# Patient Record
Sex: FEMALE | Race: WHITE | NOT HISPANIC OR LATINO | Employment: FULL TIME | URBAN - METROPOLITAN AREA
[De-identification: names, ages, dates, MRNs, and addresses within clinical notes are randomized per-mention and may not be internally consistent; named-entity substitution may affect disease eponyms.]

---

## 2018-01-19 ENCOUNTER — OFFICE VISIT (OUTPATIENT)
Dept: OBGYN | Facility: CLINIC | Age: 43
End: 2018-01-19
Payer: COMMERCIAL

## 2018-01-19 ENCOUNTER — RADIANT APPOINTMENT (OUTPATIENT)
Dept: MAMMOGRAPHY | Facility: CLINIC | Age: 43
End: 2018-01-19
Payer: COMMERCIAL

## 2018-01-19 VITALS
HEIGHT: 63 IN | WEIGHT: 116 LBS | SYSTOLIC BLOOD PRESSURE: 102 MMHG | DIASTOLIC BLOOD PRESSURE: 64 MMHG | HEART RATE: 72 BPM | BODY MASS INDEX: 20.55 KG/M2

## 2018-01-19 DIAGNOSIS — Z13.6 SCREENING FOR CARDIOVASCULAR CONDITION: ICD-10-CM

## 2018-01-19 DIAGNOSIS — Z12.31 VISIT FOR SCREENING MAMMOGRAM: ICD-10-CM

## 2018-01-19 DIAGNOSIS — B00.1 COLD SORE: ICD-10-CM

## 2018-01-19 DIAGNOSIS — Z01.419 ENCOUNTER FOR GYNECOLOGICAL EXAMINATION WITHOUT ABNORMAL FINDING: Primary | ICD-10-CM

## 2018-01-19 LAB
ALBUMIN SERPL-MCNC: 4.2 G/DL (ref 3.4–5)
ALP SERPL-CCNC: 71 U/L (ref 40–150)
ALT SERPL W P-5'-P-CCNC: 17 U/L (ref 0–50)
ANION GAP SERPL CALCULATED.3IONS-SCNC: 8 MMOL/L (ref 3–14)
AST SERPL W P-5'-P-CCNC: 18 U/L (ref 0–45)
BILIRUB SERPL-MCNC: 0.5 MG/DL (ref 0.2–1.3)
BUN SERPL-MCNC: 12 MG/DL (ref 7–30)
CALCIUM SERPL-MCNC: 8.9 MG/DL (ref 8.5–10.1)
CHLORIDE SERPL-SCNC: 105 MMOL/L (ref 94–109)
CHOLEST SERPL-MCNC: 196 MG/DL
CO2 SERPL-SCNC: 25 MMOL/L (ref 20–32)
CREAT SERPL-MCNC: 0.64 MG/DL (ref 0.52–1.04)
GFR SERPL CREATININE-BSD FRML MDRD: >90 ML/MIN/1.7M2
GLUCOSE SERPL-MCNC: 92 MG/DL (ref 70–99)
HDLC SERPL-MCNC: 81 MG/DL
LDLC SERPL CALC-MCNC: 100 MG/DL
NONHDLC SERPL-MCNC: 115 MG/DL
POTASSIUM SERPL-SCNC: 4.3 MMOL/L (ref 3.4–5.3)
PROT SERPL-MCNC: 7.9 G/DL (ref 6.8–8.8)
SODIUM SERPL-SCNC: 138 MMOL/L (ref 133–144)
TRIGL SERPL-MCNC: 74 MG/DL

## 2018-01-19 PROCEDURE — 80053 COMPREHEN METABOLIC PANEL: CPT | Performed by: OBSTETRICS & GYNECOLOGY

## 2018-01-19 PROCEDURE — 80061 LIPID PANEL: CPT | Performed by: OBSTETRICS & GYNECOLOGY

## 2018-01-19 PROCEDURE — 87624 HPV HI-RISK TYP POOLED RSLT: CPT | Performed by: OBSTETRICS & GYNECOLOGY

## 2018-01-19 PROCEDURE — 99396 PREV VISIT EST AGE 40-64: CPT | Performed by: OBSTETRICS & GYNECOLOGY

## 2018-01-19 PROCEDURE — 77063 BREAST TOMOSYNTHESIS BI: CPT | Mod: TC

## 2018-01-19 PROCEDURE — G0145 SCR C/V CYTO,THINLAYER,RESCR: HCPCS | Performed by: OBSTETRICS & GYNECOLOGY

## 2018-01-19 PROCEDURE — 36415 COLL VENOUS BLD VENIPUNCTURE: CPT | Performed by: OBSTETRICS & GYNECOLOGY

## 2018-01-19 PROCEDURE — 77067 SCR MAMMO BI INCL CAD: CPT | Mod: TC

## 2018-01-19 RX ORDER — VALACYCLOVIR HYDROCHLORIDE 1 G/1
2000 TABLET, FILM COATED ORAL 2 TIMES DAILY
Qty: 4 TABLET | Refills: 3 | Status: SHIPPED | OUTPATIENT
Start: 2018-01-19 | End: 2018-04-13

## 2018-01-19 ASSESSMENT — ANXIETY QUESTIONNAIRES
IF YOU CHECKED OFF ANY PROBLEMS ON THIS QUESTIONNAIRE, HOW DIFFICULT HAVE THESE PROBLEMS MADE IT FOR YOU TO DO YOUR WORK, TAKE CARE OF THINGS AT HOME, OR GET ALONG WITH OTHER PEOPLE: NOT DIFFICULT AT ALL
3. WORRYING TOO MUCH ABOUT DIFFERENT THINGS: NOT AT ALL
2. NOT BEING ABLE TO STOP OR CONTROL WORRYING: NOT AT ALL
1. FEELING NERVOUS, ANXIOUS, OR ON EDGE: NOT AT ALL
5. BEING SO RESTLESS THAT IT IS HARD TO SIT STILL: NOT AT ALL
6. BECOMING EASILY ANNOYED OR IRRITABLE: NOT AT ALL
7. FEELING AFRAID AS IF SOMETHING AWFUL MIGHT HAPPEN: NOT AT ALL
GAD7 TOTAL SCORE: 0

## 2018-01-19 ASSESSMENT — PATIENT HEALTH QUESTIONNAIRE - PHQ9
5. POOR APPETITE OR OVEREATING: NOT AT ALL
SUM OF ALL RESPONSES TO PHQ QUESTIONS 1-9: 0

## 2018-01-19 NOTE — MR AVS SNAPSHOT
"              After Visit Summary   2018    Stefani Lund    MRN: [id number]           Patient Information     Date Of Birth          1975        Visit Information        Provider Department      2018 9:10 AM Dinesh Hodges MD [facility]        Today's Diagnoses     Encounter for gynecological examination without abnormal finding    -  1    Screening for cardiovascular condition        Cold sore           Follow-ups after your visit        Who to contact     If you have questions or need follow up information about today's clinic visit or your schedule please contact [facility] directly at [phone].  Normal or non-critical lab and imaging results will be communicated to you by FunGoPlayhart, letter or phone within 4 business days after the clinic has received the results. If you do not hear from us within 7 days, please contact the clinic through FunGoPlayhart or phone. If you have a critical or abnormal lab result, we will notify you by phone as soon as possible.  Submit refill requests through [employer] or call your pharmacy and they will forward the refill request to us. Please allow 3 business days for your refill to be completed.          Additional Information About Your Visit        MyChart Information     [employer] lets you send messages to your doctor, view your test results, renew your prescriptions, schedule appointments and more. To sign up, go to [email or web address] . Click on \"Log in\" on the left side of the screen, which will take you to the Welcome page. Then click on \"Sign up Now\" on the right side of the page.     You will be asked to enter the access code listed below, as well as some personal information. Please follow the directions to create your username and password.     Your access code is: [email or web address]  Expires: 2018  9:49 AM     Your access code will  in 90 days. If you need help or a new code, please call your " "[facility] or [phone].        Care EveryWhere ID     This is your Care EveryWhere ID. This could be used by other organizations to access your [place] medical records  [id number]        Your Vitals Were     Pulse Height BMI (Body Mass Index)             72 5' 2.5\" (1.588 m) 20.88 kg/m2          Blood Pressure from Last 3 Encounters:   01/19/18 102/64   06/14/16 98/64   05/01/15 110/62    Weight from Last 3 Encounters:   01/19/18 116 lb (52.6 kg)   06/14/16 115 lb (52.2 kg)   05/01/15 115 lb (52.2 kg)              We Performed the Following     Comprehensive metabolic panel     HPV High Risk Types DNA Cervical     Lipid panel reflex to direct LDL Fasting     Pap imaged thin layer screen with HPV - recommended age 30 - 65          Today's Medication Changes          These changes are accurate as of: 1/19/18  9:51 AM.  If you have any questions, ask your nurse or doctor.               Start taking these medicines.        Dose/Directions    valACYclovir 1000 mg tablet   Commonly known as:  VALTREX   Used for:  Cold sore   Started by:  Dinesh Hodges MD        Dose:  2000 mg   Take 2 tablets (2,000 mg) by mouth 2 times daily   Quantity:  4 tablet   Refills:  3            Where to get your medicines      These medications were sent to [place] [address] [place] [address] [address]     Phone:  [phone]     valACYclovir 1000 mg tablet                Primary Care Provider Office Phone # Fax #    Halle Rizo [phone]       [facility] [address]  [facility] [address]        Equal Access to Services     ANKITA CORRAL : Hadii mich Dooley, candy weston, qaroseanne bush. So [facility] [phone].    ATENCIÓN: Si habla español, tiene a garcia disposición servicios gratuitos de asistencia lingüística. Llame al [phone].    We comply with applicable federal " civil rights laws and [place] laws. We do not discriminate on the basis of race, color, national origin, age, disability, sex, sexual orientation, or gender identity.            Thank you!     Thank you for choosing [facility]  for your care. Our goal is always to provide you with excellent care. Hearing back from our patients is one way we can continue to improve our services. Please take a few minutes to complete the written survey that you may receive in the mail after your visit with us. Thank you!             Your Updated Medication List - Protect others around you: Learn how to safely use, store and throw away your medicines at www.disposemymeds.org.          This list is accurate as of: 1/19/18  9:51 AM.  Always use your most recent med list.                   Brand Name Dispense Instructions for use Diagnosis    valACYclovir 1000 mg tablet    VALTREX    4 tablet    Take 2 tablets (2,000 mg) by mouth 2 times daily    Cold sore       VITAMIN D3 PO      Taking seasonally.

## 2018-01-19 NOTE — PROGRESS NOTES
Stefani is a 42 year old  female who presents for annual exam.     Besides routine health maintenance,  she would like to discuss sensitive ovaries.    HPI:  The patient's PCP is Halle Rizo.    Noting cycle changes. Having midcycle pain.   Cycles still regular.  Bladder good.   Gets cold sores. Zovirax ointment expensive      GYNECOLOGIC HISTORY:    No LMP recorded.  Her current contraception method is: none.  She  reports that she has never smoked. She has never used smokeless tobacco.      Patient is sexually active.  STD testing offered?  Declined  Last PHQ-9 score on record =   PHQ-9 SCORE 2018   Total Score 0     Last GAD7 score on record =   JUAN FRANCISCO-7 SCORE 2018   Total Score 0     Alcohol Score = 2    HEALTH MAINTENANCE:  Cholesterol: 13   Total= 199, Triglycerides=57, HDL=64, ECF=122, FBS=87  Last Mammo: 16, Result: normal, Next Mammo: today   Pap: 13 wnl, HPV-  Colonoscopy:  Never, Result: not applicable, Next Colonoscopy: 7 years.  Dexa:  Never    Health maintenance updated:  yes    HISTORY:  Obstetric History       T0      L2     SAB0   TAB0   Ectopic0   Multiple0   Live Births0       Obstetric Comments   Adopted       There is no problem list on file for this patient.    Past Surgical History:   Procedure Laterality Date     LAPAROSCOPY      Endometriosis      Social History   Substance Use Topics     Smoking status: Never Smoker     Smokeless tobacco: Never Used     Alcohol use 0.0 oz/week     0 Standard drinks or equivalent per week      Problem (# of Occurrences) Relation (Name,Age of Onset)    Hyperlipidemia (1) Other    Hypertension (2) Father, Paternal Grandmother            Current Outpatient Prescriptions   Medication Sig     valACYclovir (VALTREX) 1000 mg tablet Take 2 tablets (2,000 mg) by mouth 2 times daily     Cholecalciferol (VITAMIN D3 PO) Taking seasonally.     No current facility-administered medications for this visit.   "    No Known Allergies    Past medical, surgical, social and family histories were reviewed and updated in EPIC.    ROS:   12 point review of systems negative other than symptoms noted below.  Breast: Tenderness  Gastrointestinal: Bloating and Blood in Stools  Genitourinary: Pelvic Pain and Spotting    EXAM:  /64  Pulse 72  Ht 5' 2.5\" (1.588 m)  Wt 116 lb (52.6 kg)  BMI 20.88 kg/m2   BMI: Body mass index is 20.88 kg/(m^2).    PHYSICAL EXAM:  Constitutional:  Appearance: Well nourished, well developed, alert, in no acute distress  Neck:  Lymph Nodes:  No lymphadenopathy present    Thyroid:  Gland size normal, nontender, no nodules or masses present  on palpation  Chest:  Respiratory Effort:  Breathing unlabored  Cardiovascular:    Heart: Auscultation:  Regular rate, normal rhythm, no murmurs present  Breasts: Inspection of Breasts:  No lymphadenopathy present., Palpation of Breasts and Axillae:  No masses present on palpation, no breast tenderness., Axillary Lymph Nodes:  No lymphadenopathy present. and No nodularity, asymmetry or nipple discharge bilaterally.  Gastrointestinal:   Abdominal Examination:  Abdomen nontender to palpation, tone normal without rigidity or guarding, no masses present, umbilicus without lesions   Liver and Spleen:  No hepatomegaly present, liver nontender to palpation    Hernias:  No hernias present  Lymphatic: Lymph Nodes:  No other lymphadenopathy present  Skin:  General Inspection:  No rashes present, no lesions present, no areas of  discoloration    Genitalia and Groin:  No rashes present, no lesions present, no areas of  discoloration, no masses present  Neurologic/Psychiatric:    Mental Status:  Oriented X3     Pelvic Exam:  External Genitalia:     Normal appearance for age, no discharge present, no tenderness present, no inflammatory lesions present, color normal  Vagina:     Normal vaginal vault without central or paravaginal defects, no discharge present, no inflammatory " lesions present, no masses present  Bladder:     Nontender to palpation  Urethra:   Urethral Body:  Urethra palpation normal, urethra structural support normal   Urethral Meatus:  No erythema or lesions present  Cervix:     Appearance healthy, no lesions present, nontender to palpation, no bleeding present  Uterus:     Uterus: firm, normal sized and nontender, anteverted in position.   Adnexa:     No adnexal tenderness present, no adnexal masses present  Perineum:     Perineum within normal limits, no evidence of trauma, no rashes or skin lesions present  Anus:     Anus within normal limits, no hemorrhoids present  Inguinal Lymph Nodes:     No lymphadenopathy present  Pubic Hair:     Normal pubic hair distribution for age  Genitalia and Groin:     No rashes present, no lesions present, no areas of discoloration, no masses present      COUNSELING:   Reviewed preventive health counseling, as reflected in patient instructions       Regular exercise    BMI: Body mass index is 20.88 kg/(m^2).        ASSESSMENT:  42 year old female with satisfactory annual exam.    ICD-10-CM    1. Encounter for gynecological examination without abnormal finding Z01.419 Pap imaged thin layer screen with HPV - recommended age 30 - 65     HPV High Risk Types DNA Cervical   2. Screening for cardiovascular condition Z13.6 Lipid panel reflex to direct LDL Fasting     Comprehensive metabolic panel   3. Cold sore B00.1 valACYclovir (VALTREX) 1000 mg tablet       PLAN:  Change to PO Valtrex.     Dinesh Hodges MD

## 2018-01-23 ASSESSMENT — ANXIETY QUESTIONNAIRES: GAD7 TOTAL SCORE: 0

## 2018-01-24 LAB
COPATH REPORT: NORMAL
PAP: NORMAL

## 2018-01-26 LAB
FINAL DIAGNOSIS: NORMAL
HPV HR 12 DNA CVX QL NAA+PROBE: NEGATIVE
HPV16 DNA SPEC QL NAA+PROBE: NEGATIVE
HPV18 DNA SPEC QL NAA+PROBE: NEGATIVE
SPECIMEN DESCRIPTION: NORMAL
SPECIMEN SOURCE CVX/VAG CYTO: NORMAL

## 2018-04-13 ENCOUNTER — TELEPHONE (OUTPATIENT)
Dept: OBGYN | Facility: CLINIC | Age: 43
End: 2018-04-13

## 2018-04-13 DIAGNOSIS — B00.1 COLD SORE: ICD-10-CM

## 2018-04-13 NOTE — TELEPHONE ENCOUNTER
Left detailed message on pt's phone. Called [facility], pt picked up the fill rx'd on 1/19/18 today so she has a full treatment course for any current outbreak sx she's having.    Routing to MD Hodges- Valtrex refill is pended for you if you usually do standing refills for when pts get their next HSV outbreak. Thank you!

## 2018-04-14 RX ORDER — VALACYCLOVIR HYDROCHLORIDE 1 G/1
2000 TABLET, FILM COATED ORAL 2 TIMES DAILY
Qty: 4 TABLET | Refills: 3 | Status: SHIPPED | OUTPATIENT
Start: 2018-04-14 | End: 2021-01-12

## 2019-02-21 ENCOUNTER — OFFICE VISIT (OUTPATIENT)
Dept: OBGYN | Facility: CLINIC | Age: 44
End: 2019-02-21
Payer: COMMERCIAL

## 2019-02-21 VITALS
WEIGHT: 120.8 LBS | BODY MASS INDEX: 22.23 KG/M2 | HEIGHT: 62 IN | DIASTOLIC BLOOD PRESSURE: 62 MMHG | SYSTOLIC BLOOD PRESSURE: 116 MMHG

## 2019-02-21 DIAGNOSIS — Z01.419 ENCOUNTER FOR GYNECOLOGICAL EXAMINATION WITHOUT ABNORMAL FINDING: Primary | ICD-10-CM

## 2019-02-21 DIAGNOSIS — F41.9 ANXIETY: ICD-10-CM

## 2019-02-21 PROCEDURE — 99396 PREV VISIT EST AGE 40-64: CPT | Performed by: OBSTETRICS & GYNECOLOGY

## 2019-02-21 RX ORDER — CITALOPRAM HYDROBROMIDE 10 MG/1
10 TABLET ORAL DAILY
COMMUNITY

## 2019-02-21 ASSESSMENT — PATIENT HEALTH QUESTIONNAIRE - PHQ9
SUM OF ALL RESPONSES TO PHQ QUESTIONS 1-9: 3
5. POOR APPETITE OR OVEREATING: SEVERAL DAYS

## 2019-02-21 ASSESSMENT — ANXIETY QUESTIONNAIRES
GAD7 TOTAL SCORE: 5
2. NOT BEING ABLE TO STOP OR CONTROL WORRYING: SEVERAL DAYS
6. BECOMING EASILY ANNOYED OR IRRITABLE: NOT AT ALL
7. FEELING AFRAID AS IF SOMETHING AWFUL MIGHT HAPPEN: SEVERAL DAYS
1. FEELING NERVOUS, ANXIOUS, OR ON EDGE: SEVERAL DAYS
IF YOU CHECKED OFF ANY PROBLEMS ON THIS QUESTIONNAIRE, HOW DIFFICULT HAVE THESE PROBLEMS MADE IT FOR YOU TO DO YOUR WORK, TAKE CARE OF THINGS AT HOME, OR GET ALONG WITH OTHER PEOPLE: SOMEWHAT DIFFICULT
3. WORRYING TOO MUCH ABOUT DIFFERENT THINGS: SEVERAL DAYS
5. BEING SO RESTLESS THAT IT IS HARD TO SIT STILL: NOT AT ALL

## 2019-02-21 ASSESSMENT — MIFFLIN-ST. JEOR: SCORE: 1151.2

## 2019-02-21 NOTE — PROGRESS NOTES
Stefani is a 43 year old  female who presents for annual exam.     Besides routine health maintenance,  she would like to discuss menopausal testing if she is or isnt in the begging stages.    HPI:  The patient's PCP is  Halle Rizo.    Having issues with anxiety that started this Fall. FH of anxiety.  Started on Celexa 10mg. Feels there is a little improvement. Wondered if this was hormone related.  Having monthly cycles  PCP did thyroid.      GYNECOLOGIC HISTORY:    Patient's last menstrual period was 2019 (exact date).  Her current contraception method is: none.  She  reports that  has never smoked. she has never used smokeless tobacco.    Patient is sexually active.  STD testing offered?  Declined  Last PHQ-9 score on record =   PHQ-9 SCORE 2019   PHQ-9 Total Score 3     Last GAD7 score on record =   JUAN FRANCISCO-7 SCORE 2019   Total Score 5     Alcohol Score = 2    HEALTH MAINTENANCE:  Cholesterol: (  Cholesterol   Date Value Ref Range Status   2018 196 <200 mg/dL Final   2013 199 115 - 199 mg/dL Final      Last Mammo: one year ago, Result: normal, Next Mammo: today   Pap: (  Lab Results   Component Value Date    PAP NIL 2018    )   Colonoscopy:none , Result: not applicable, Next Colonoscopy:  years.  Dexa:  none    Health maintenance updated:  yes    HISTORY:  Obstetric History       T0      L2     SAB0   TAB0   Ectopic0   Multiple0   Live Births0       Obstetric Comments   Adopted       There is no problem list on file for this patient.    Past Surgical History:   Procedure Laterality Date     LAPAROSCOPY      Endometriosis      Social History     Tobacco Use     Smoking status: Never Smoker     Smokeless tobacco: Never Used   Substance Use Topics     Alcohol use: Yes     Alcohol/week: 0.0 oz      Problem (# of Occurrences) Relation (Name,Age of Onset)    Hyperlipidemia (1) Other    Hypertension (2) Father, Paternal Grandmother         "    Current Outpatient Medications   Medication Sig     Cholecalciferol (VITAMIN D3 PO) Taking seasonally.     citalopram (CELEXA) 10 MG tablet Take 10 mg by mouth daily     valACYclovir (VALTREX) 1000 mg tablet Take 2 tablets (2,000 mg) by mouth 2 times daily     No current facility-administered medications for this visit.      No Known Allergies    Past medical, surgical, social and family histories were reviewed and updated in EPIC.    ROS:   12 point review of systems negative other than symptoms noted below.  Head: Nasal Congestion  Gastrointestinal: Nausea  Genitourinary: Irregular Menses, Night Sweats and Spotting  Musculoskeletal: Joint Pain  Psychiatric: Anxiety and Difficulty Sleeping    EXAM:  /62   Ht 1.575 m (5' 2\")   Wt 54.8 kg (120 lb 12.8 oz)   LMP 01/31/2019 (Exact Date)   BMI 22.09 kg/m     BMI: Body mass index is 22.09 kg/m .    PHYSICAL EXAM:  Constitutional:  Appearance: Well nourished, well developed, alert, in no acute distress  Neck:  Lymph Nodes:  No lymphadenopathy present    Thyroid:  Gland size normal, nontender, no nodules or masses present  on palpation  Chest:  Respiratory Effort:  Breathing unlabored  Cardiovascular:    Heart: Auscultation:  Regular rate, normal rhythm, no murmurs present  Breasts: Inspection of Breasts:  No lymphadenopathy present., Palpation of Breasts and Axillae:  No masses present on palpation, no breast tenderness., Axillary Lymph Nodes:  No lymphadenopathy present. and No nodularity, asymmetry or nipple discharge bilaterally.  Gastrointestinal:   Abdominal Examination:  Abdomen nontender to palpation, tone normal without rigidity or guarding, no masses present, umbilicus without lesions   Liver and Spleen:  No hepatomegaly present, liver nontender to palpation    Hernias:  No hernias present  Lymphatic: Lymph Nodes:  No other lymphadenopathy present  Skin:  General Inspection:  No rashes present, no lesions present, no areas of "  discoloration    Genitalia and Groin:  No rashes present, no lesions present, no areas of  discoloration, no masses present  Neurologic/Psychiatric:    Mental Status:  Oriented X3     Pelvic Exam:  External Genitalia:     Normal appearance for age, no discharge present, no tenderness present, no inflammatory lesions present, color normal  Vagina:     Normal vaginal vault without central or paravaginal defects, no discharge present, no inflammatory lesions present, no masses present  Bladder:     Nontender to palpation  Urethra:   Urethral Body:  Urethra palpation normal, urethra structural support normal   Urethral Meatus:  No erythema or lesions present  Cervix:     Appearance healthy, no lesions present, nontender to palpation, no bleeding present  Uterus:     Uterus: firm, normal sized and nontender, anteverted in position.   Adnexa:     No adnexal tenderness present, no adnexal masses present  Perineum:     Perineum within normal limits, no evidence of trauma, no rashes or skin lesions present  Anus:     Anus within normal limits, no hemorrhoids present  Inguinal Lymph Nodes:     No lymphadenopathy present  Pubic Hair:     Normal pubic hair distribution for age  Genitalia and Groin:     No rashes present, no lesions present, no areas of discoloration, no masses present      COUNSELING:   Reviewed preventive health counseling, as reflected in patient instructions       Regular exercise    BMI: Body mass index is 22.09 kg/m .      ASSESSMENT:  43 year old female with satisfactory annual exam.    ICD-10-CM    1. Encounter for routine gynecological examination Z01.419    2. Anxiety F41.9        PLAN:  Discussed cycles and anxiety. Doubt perimenopausal as an issue. Continue celexa and consider increasing dose to 20mg.    Dinesh Hodges MD

## 2019-02-22 ASSESSMENT — ANXIETY QUESTIONNAIRES: GAD7 TOTAL SCORE: 5

## 2019-04-24 ENCOUNTER — ANCILLARY PROCEDURE (OUTPATIENT)
Dept: MAMMOGRAPHY | Facility: CLINIC | Age: 44
End: 2019-04-24
Attending: OBSTETRICS & GYNECOLOGY
Payer: COMMERCIAL

## 2019-04-24 DIAGNOSIS — Z12.31 VISIT FOR SCREENING MAMMOGRAM: ICD-10-CM

## 2019-04-24 PROCEDURE — 77063 BREAST TOMOSYNTHESIS BI: CPT | Mod: TC

## 2019-04-24 PROCEDURE — 77067 SCR MAMMO BI INCL CAD: CPT | Mod: TC

## 2019-06-12 DIAGNOSIS — B00.1 COLD SORE: ICD-10-CM

## 2019-06-12 RX ORDER — VALACYCLOVIR HYDROCHLORIDE 1 G/1
2000 TABLET, FILM COATED ORAL 2 TIMES DAILY
Qty: 4 TABLET | Refills: 3 | Status: SHIPPED | OUTPATIENT
Start: 2019-06-12 | End: 2022-09-29

## 2019-06-12 NOTE — TELEPHONE ENCOUNTER
"Requested Prescriptions   Pending Prescriptions Disp Refills     valACYclovir (VALTREX) 1000 mg tablet [Pharmacy Med Name: VALACYCLOVIR HCL 1 GRAM TABLET] 4 tablet 3     Sig: TAKE 2 TABLETS (2,000 MG) BY MOUTH 2 TIMES DAILY       Antivirals for Herpes Protocol Failed - 6/12/2019 11:30 AM        Failed - Normal serum creatinine on file in past 12 months     Recent Labs   Lab Test 01/19/18  0922   CR 0.64             Passed - Patient is age 12 or older        Passed - Recent (12 mo) or future (30 days) visit within the authorizing provider's specialty     Patient had office visit in the last 12 months or has a visit in the next 30 days with authorizing provider or within the authorizing provider's specialty.  See \"Patient Info\" tab in inbasket, or \"Choose Columns\" in Meds & Orders section of the refill encounter.              Passed - Medication is active on med list          Medication refilled  Madhavi De Jesus RN on 6/12/2019 at 1:01 PM    "

## 2020-11-12 ENCOUNTER — HOSPITAL ENCOUNTER (OUTPATIENT)
Dept: MAMMOGRAPHY | Facility: CLINIC | Age: 45
Discharge: HOME | End: 2020-11-12
Attending: OBSTETRICS & GYNECOLOGY | Admitting: OBSTETRICS & GYNECOLOGY
Payer: COMMERCIAL

## 2020-11-12 DIAGNOSIS — Z12.31 VISIT FOR SCREENING MAMMOGRAM: ICD-10-CM

## 2020-11-12 PROCEDURE — 77063 BREAST TOMOSYNTHESIS BI: CPT

## 2021-01-11 NOTE — PROGRESS NOTES
"Stefani is a 45 year old  female who presents for annual exam.     Besides routine health maintenance,  she would like to discuss ovarian pain.    HPI:  Here today for yearly exam --doing well.  Continues to have fairly regular menses.  Some inconsistency in timing --cycles every 21-28d currently.  Generally 5d menses --not terribly heavy or painful.  Occ mid cycle spotting but not every month.  Occ \"ovary pain\" --felt a bit of discomfort yesterday which is midcycle.  No bowel issues.  Some NICOLE --has noticed some leaking with sneeze, laugh, exercise.  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    Regular menses? no  Menses every 21 days.  Length of menses: 5 days    Her current contraception method is: none.  She  reports that she has never smoked. She has never used smokeless tobacco.    Patient is sexually active.  STD testing offered?  Declined  Last PHQ-9 score on record =   PHQ-9 SCORE 2021   PHQ-9 Total Score 0     Last GAD7 score on record =   JUAN FRANCISCO-7 SCORE 2021   Total Score 0     Alcohol Score = 2    HEALTH MAINTENANCE:  Cholesterol:   Recent Labs   Lab Test 18  0922 13   CHOL 196 199   HDL 81 64   * 124   TRIG 74 57     Last Mammo: 2020, Result: Normal, Next Mammo: 2021  Pap: HPV NEGATIVE  Lab Results   Component Value Date    PAP NIL 2018      Colonoscopy:  NA, Next Colonoscopy: AGE 49.  Dexa:  NA    Health maintenance updated:  no, due for tdap.     HISTORY:  OB History    Para Term  AB Living   0 0 0 0 0 2   SAB TAB Ectopic Multiple Live Births   0 0 0 0 0   Obstetric Comments   Adopted " "      There is no problem list on file for this patient.    Past Surgical History:   Procedure Laterality Date     LAPAROSCOPY  1999/2000    Endometriosis      Social History     Tobacco Use     Smoking status: Never Smoker     Smokeless tobacco: Never Used   Substance Use Topics     Alcohol use: Yes     Frequency: 2-4 times a month     Drinks per session: 1 or 2     Binge frequency: Never      Problem (# of Occurrences) Relation (Name,Age of Onset)    Hyperlipidemia (1) Other    Hypertension (2) Father, Paternal Grandmother            Current Outpatient Medications   Medication Sig     Cholecalciferol (VITAMIN D3 PO) Taking seasonally.     citalopram (CELEXA) 10 MG tablet Take 10 mg by mouth daily     valACYclovir (VALTREX) 1000 mg tablet Take 2 tablets (2,000 mg) by mouth 2 times daily     valACYclovir (VALTREX) 1000 mg tablet TAKE 2 TABLETS (2,000 MG) BY MOUTH 2 TIMES DAILY (Patient not taking: Reported on 1/12/2021)     No current facility-administered medications for this visit.      No Known Allergies    Past medical, surgical, social and family histories were reviewed and updated in EPIC.    ROS:   12 point review of systems negative other than symptoms noted below or in the HPI.  No urinary frequency or dysuria, bladder or kidney problems    EXAM:  /66   Pulse 62   Ht 1.575 m (5' 2\")   Wt 57.2 kg (126 lb)   LMP 01/01/2020   BMI 23.05 kg/m     BMI: Body mass index is 23.05 kg/m .    PHYSICAL EXAM:  Constitutional:   Appearance: Well nourished, well developed, alert, in no acute distress  Neck:  Lymph Nodes:  No lymphadenopathy present    Thyroid:  Gland size normal, nontender, no nodules or masses present  on palpation  Chest:  Respiratory Effort:  Breathing unlabored  Cardiovascular:    Heart: Auscultation:  Regular rate, normal rhythm, no murmurs present  Breasts: Inspection of Breasts:  No lymphadenopathy present., Palpation of Breasts and Axillae:  No masses present on palpation, no breast " tenderness., Axillary Lymph Nodes:  No lymphadenopathy present. and No nodularity, asymmetry or nipple discharge bilaterally.  Gastrointestinal:   Abdominal Examination:  Abdomen nontender to palpation, tone normal without rigidity or guarding, no masses present, umbilicus without lesions   Liver and Spleen:  No hepatomegaly present, liver nontender to palpation    Hernias:  No hernias present  Lymphatic: Lymph Nodes:  No other lymphadenopathy present  Skin:  General Inspection:  No rashes present, no lesions present, no areas of  discoloration  Neurologic:    Mental Status:  Oriented X3.  Normal strength and tone, sensory exam                grossly normal, mentation intact and speech normal.    Psychiatric:   Mentation appears normal and affect normal/bright.         Pelvic Exam:  External Genitalia:     Normal appearance for age, no discharge present, no tenderness present, no inflammatory lesions present, color normal  Vagina:     Normal vaginal vault without central or paravaginal defects, no discharge present, no inflammatory lesions present, no masses present  Bladder:     Nontender to palpation  Urethra:   Urethral Body:  Urethra palpation normal, urethra structural support normal   Urethral Meatus:  No erythema or lesions present  Cervix:     Appearance healthy, no lesions present, nontender to palpation, no bleeding present  Uterus:     Uterus: firm, normal sized and nontender, midplane in position.   Adnexa:     No adnexal tenderness present, no adnexal masses present  Perineum:     Perineum within normal limits, no evidence of trauma, no rashes or skin lesions present  Anus:     Anus within normal limits, no hemorrhoids present  Inguinal Lymph Nodes:     No lymphadenopathy present  Pubic Hair:     Normal pubic hair distribution for age  Genitalia and Groin:     No rashes present, no lesions present, no areas of discoloration, no masses present      COUNSELING:   Reviewed preventive health counseling, as  reflected in patient instructions  Special attention given to:        Regular exercise       Healthy diet/nutrition       (Latesha)menopause management    BMI: Body mass index is 23.05 kg/m .      ASSESSMENT:  45 year old female with satisfactory annual exam.    ICD-10-CM    1. Encounter for gynecological examination without abnormal finding  Z01.419 Pap imaged thin layer screen with HPV - recommended age 30 - 65     HPV High Risk Types DNA Cervical   2. Cold sore  B00.1 valACYclovir (VALTREX) 1000 mg tablet       PLAN:  Patient Instructions   Schedule your annual screening mammogram  Follow up with your primary care provider for your other medical problems.  Continue self breast exam.  Increase physical activity and exercise.  Lab and pap smear results will be called to the patient.  Co-testing repeat today per Dr. Hodges's previous plan.  If normal, due to lack of hx of abnormal pap smears, will adapt current guidelines with co-testing every 5yrs.  Usual safety and preventative measures counseling done.  Tdap today.      Salina Garcia MD

## 2021-01-12 ENCOUNTER — OFFICE VISIT (OUTPATIENT)
Dept: OBGYN | Facility: CLINIC | Age: 46
End: 2021-01-12
Payer: COMMERCIAL

## 2021-01-12 VITALS
HEIGHT: 62 IN | DIASTOLIC BLOOD PRESSURE: 66 MMHG | HEART RATE: 62 BPM | BODY MASS INDEX: 23.19 KG/M2 | WEIGHT: 126 LBS | SYSTOLIC BLOOD PRESSURE: 110 MMHG

## 2021-01-12 DIAGNOSIS — Z23 NEED FOR VACCINATION: ICD-10-CM

## 2021-01-12 DIAGNOSIS — B00.1 COLD SORE: ICD-10-CM

## 2021-01-12 DIAGNOSIS — Z01.419 ENCOUNTER FOR GYNECOLOGICAL EXAMINATION WITHOUT ABNORMAL FINDING: Primary | ICD-10-CM

## 2021-01-12 PROCEDURE — G0145 SCR C/V CYTO,THINLAYER,RESCR: HCPCS | Performed by: OBSTETRICS & GYNECOLOGY

## 2021-01-12 PROCEDURE — 99396 PREV VISIT EST AGE 40-64: CPT | Mod: 25 | Performed by: OBSTETRICS & GYNECOLOGY

## 2021-01-12 PROCEDURE — 90715 TDAP VACCINE 7 YRS/> IM: CPT | Performed by: OBSTETRICS & GYNECOLOGY

## 2021-01-12 PROCEDURE — 90471 IMMUNIZATION ADMIN: CPT | Performed by: OBSTETRICS & GYNECOLOGY

## 2021-01-12 PROCEDURE — 87624 HPV HI-RISK TYP POOLED RSLT: CPT | Performed by: OBSTETRICS & GYNECOLOGY

## 2021-01-12 RX ORDER — VALACYCLOVIR HYDROCHLORIDE 1 G/1
2000 TABLET, FILM COATED ORAL 2 TIMES DAILY
Qty: 90 TABLET | Refills: 3 | Status: SHIPPED | OUTPATIENT
Start: 2021-01-12 | End: 2022-02-15

## 2021-01-12 SDOH — HEALTH STABILITY: MENTAL HEALTH: HOW OFTEN DO YOU HAVE A DRINK CONTAINING ALCOHOL?: 2-4 TIMES A MONTH

## 2021-01-12 SDOH — HEALTH STABILITY: MENTAL HEALTH: HOW OFTEN DO YOU HAVE 6 OR MORE DRINKS ON ONE OCCASION?: NEVER

## 2021-01-12 SDOH — HEALTH STABILITY: MENTAL HEALTH: HOW MANY STANDARD DRINKS CONTAINING ALCOHOL DO YOU HAVE ON A TYPICAL DAY?: 1 OR 2

## 2021-01-12 ASSESSMENT — ANXIETY QUESTIONNAIRES
IF YOU CHECKED OFF ANY PROBLEMS ON THIS QUESTIONNAIRE, HOW DIFFICULT HAVE THESE PROBLEMS MADE IT FOR YOU TO DO YOUR WORK, TAKE CARE OF THINGS AT HOME, OR GET ALONG WITH OTHER PEOPLE: NOT DIFFICULT AT ALL
6. BECOMING EASILY ANNOYED OR IRRITABLE: NOT AT ALL
1. FEELING NERVOUS, ANXIOUS, OR ON EDGE: NOT AT ALL
3. WORRYING TOO MUCH ABOUT DIFFERENT THINGS: NOT AT ALL
5. BEING SO RESTLESS THAT IT IS HARD TO SIT STILL: NOT AT ALL
2. NOT BEING ABLE TO STOP OR CONTROL WORRYING: NOT AT ALL
GAD7 TOTAL SCORE: 0
7. FEELING AFRAID AS IF SOMETHING AWFUL MIGHT HAPPEN: NOT AT ALL

## 2021-01-12 ASSESSMENT — MIFFLIN-ST. JEOR: SCORE: 1164.78

## 2021-01-12 ASSESSMENT — PATIENT HEALTH QUESTIONNAIRE - PHQ9
5. POOR APPETITE OR OVEREATING: NOT AT ALL
SUM OF ALL RESPONSES TO PHQ QUESTIONS 1-9: 0

## 2021-01-12 NOTE — PATIENT INSTRUCTIONS
Schedule your annual screening mammogram  Follow up with your primary care provider for your other medical problems.  Continue self breast exam.  Increase physical activity and exercise.  Lab and pap smear results will be called to the patient.  Co-testing repeat today per Dr. Hodges's previous plan.  If normal, due to lack of hx of abnormal pap smears, will adapt current guidelines with co-testing every 5yrs.  Usual safety and preventative measures counseling done.  Tdap today.

## 2021-01-13 ASSESSMENT — ANXIETY QUESTIONNAIRES: GAD7 TOTAL SCORE: 0

## 2021-01-14 LAB
COPATH REPORT: NORMAL
PAP: NORMAL

## 2022-02-15 DIAGNOSIS — B00.1 COLD SORE: ICD-10-CM

## 2022-02-15 RX ORDER — VALACYCLOVIR HYDROCHLORIDE 1 G/1
TABLET, FILM COATED ORAL
Qty: 90 TABLET | Refills: 0 | Status: SHIPPED | OUTPATIENT
Start: 2022-02-15 | End: 2023-10-19

## 2022-02-15 NOTE — TELEPHONE ENCOUNTER
"Requested Prescriptions   Pending Prescriptions Disp Refills     valACYclovir (VALTREX) 1000 mg tablet [Pharmacy Med Name: VALACYCLOVIR 1GM TABLETS] 90 tablet 3     Sig: TAKE 2 TABLETS(2000 MG) BY MOUTH TWICE DAILY       Antivirals for Herpes Protocol Failed - 2/15/2022  3:42 PM        Failed - Recent (12 mo) or future (30 days) visit within the authorizing provider's specialty     Patient has had an office visit with the authorizing provider or a provider within the authorizing providers department within the previous 12 mos or has a future within next 30 days. See \"Patient Info\" tab in inbasket, or \"Choose Columns\" in Meds & Orders section of the refill encounter.              Failed - Normal serum creatinine on file in past 12 months     Recent Labs   Lab Test 01/19/18  0922   CR 0.64       Ok to refill medication if creatinine is low          Passed - Patient is age 12 or older        Passed - Medication is active on med list           Prescription approved per [facility] Refill Protocol.  Appointment needed for further refills  Madhavi De Jesus RN on 2/15/2022 at 3:49 PM    "

## 2022-09-29 ENCOUNTER — OFFICE VISIT (OUTPATIENT)
Dept: OBGYN | Facility: CLINIC | Age: 47
End: 2022-09-29
Payer: COMMERCIAL

## 2022-09-29 VITALS
HEIGHT: 63 IN | WEIGHT: 130 LBS | BODY MASS INDEX: 23.04 KG/M2 | DIASTOLIC BLOOD PRESSURE: 56 MMHG | SYSTOLIC BLOOD PRESSURE: 100 MMHG

## 2022-09-29 DIAGNOSIS — Z01.419 ENCOUNTER FOR GYNECOLOGICAL EXAMINATION WITHOUT ABNORMAL FINDING: Primary | ICD-10-CM

## 2022-09-29 DIAGNOSIS — Z23 NEED FOR PROPHYLACTIC VACCINATION AND INOCULATION AGAINST INFLUENZA: ICD-10-CM

## 2022-09-29 PROCEDURE — 90471 IMMUNIZATION ADMIN: CPT | Performed by: OBSTETRICS & GYNECOLOGY

## 2022-09-29 PROCEDURE — 90686 IIV4 VACC NO PRSV 0.5 ML IM: CPT | Performed by: OBSTETRICS & GYNECOLOGY

## 2022-09-29 PROCEDURE — 99396 PREV VISIT EST AGE 40-64: CPT | Mod: 25 | Performed by: OBSTETRICS & GYNECOLOGY

## 2022-09-29 RX ORDER — CHLORAL HYDRATE 500 MG
2 CAPSULE ORAL DAILY
COMMUNITY

## 2022-09-29 ASSESSMENT — ANXIETY QUESTIONNAIRES
1. FEELING NERVOUS, ANXIOUS, OR ON EDGE: NOT AT ALL
3. WORRYING TOO MUCH ABOUT DIFFERENT THINGS: NOT AT ALL
GAD7 TOTAL SCORE: 0
5. BEING SO RESTLESS THAT IT IS HARD TO SIT STILL: NOT AT ALL
IF YOU CHECKED OFF ANY PROBLEMS ON THIS QUESTIONNAIRE, HOW DIFFICULT HAVE THESE PROBLEMS MADE IT FOR YOU TO DO YOUR WORK, TAKE CARE OF THINGS AT HOME, OR GET ALONG WITH OTHER PEOPLE: NOT DIFFICULT AT ALL
6. BECOMING EASILY ANNOYED OR IRRITABLE: NOT AT ALL
GAD7 TOTAL SCORE: 0
7. FEELING AFRAID AS IF SOMETHING AWFUL MIGHT HAPPEN: NOT AT ALL
2. NOT BEING ABLE TO STOP OR CONTROL WORRYING: NOT AT ALL

## 2022-09-29 ASSESSMENT — PATIENT HEALTH QUESTIONNAIRE - PHQ9
5. POOR APPETITE OR OVEREATING: NOT AT ALL
SUM OF ALL RESPONSES TO PHQ QUESTIONS 1-9: 0

## 2022-09-29 NOTE — PATIENT INSTRUCTIONS
Follow up with your primary care provider for your other medical problems.  Continue self breast exam.  Increase physical activity and exercise.  Usual safety and preventative measures counseling done.  Flu Shot today.  Last pap smear (2021) was normal and negative for the DNA of high risk HPV subtypes.  No pap was obtained this year.  This was discussed with the patient and she agrees with the plan.  Card given for Nuvia Schaffer for sex therapy given complicated past with sexual health.   Will arrange first colonoscopy; referral placed by PCP per patient report.

## 2022-09-29 NOTE — PROGRESS NOTES
Stefani is a 47 year old  female who presents for annual exam.     Besides routine health maintenance, she has no other health concerns today .    HPI:  Here today for yearly exam --doing well.  Continues to have fairly regular monthly cycles x 4-5d.  Not terribly heavy bleeding or cramping. Occ cramping with ovulation.  Has had a couple of cycles which were a week or two late.  +SA but infrequent.  Hx of fertility issues, infidelity, etc.  Interested in referral to sex therapist.  No bowel issues.  Occ NICOLE with running, exercise, sneeze, etc    ; teaches high school Spanish at [place]; kids are doing well --son is senior and daughter is 16yo at [employer]; spend summer in [place]  -staying active with pickleball!  Also doing some strength training, walking, etc  +mammo next week; +SBE --no issues  PCP -Halle Rizo --follows bloodwork, meds, etc.    -due for colonoscopy but has had referral placed by PCP  -agrees to flu shot today but would like to push back covid booster; +COVID in late July      GYNECOLOGIC HISTORY:    Patient's last menstrual period was 2022.    Regular menses? no  Menses every 28-40 days.  Length of menses: 5 days    Her current contraception method is: none.  She  reports that she has never smoked. She has never used smokeless tobacco.    Patient is sexually active.  STD testing offered?  Declined  Last PHQ-9 score on record =   PHQ-9 SCORE 2022   PHQ-9 Total Score 0     Last GAD7 score on record =   JUAN FRANCISCO-7 SCORE 2022   Total Score 0     Alcohol Score = 2    HEALTH MAINTENANCE:  Cholesterol: 2022   Total= 226, Triglycerides=109, HDL=47, SZV=432, FBS=89, TSH=1.09    Cholesterol   Date Value Ref Range Status   2018 196 <200 mg/dL Final   2013 199 115 - 199 mg/dL Final      Last Mammo: , Result: Normal, Next Mammo: scheduled next week   Pap: (  Lab Results   Component Value Date    PAP NIL 2021    PAP NIL 2018    )  hpv  "neg  Colonoscopy:  never, Result: never, Next Colonoscopy: was given referral from PCP  Horace:  never    Health maintenance updated:  yes, will think about vaccines    HISTORY:  OB History    Para Term  AB Living   0 0 0 0 0 2   SAB IAB Ectopic Multiple Live Births   0 0 0 0 0   Obstetric Comments   Adopted       There is no problem list on file for this patient.    Past Surgical History:   Procedure Laterality Date     LAPAROSCOPY      Endometriosis      Social History     Tobacco Use     Smoking status: Never Smoker     Smokeless tobacco: Never Used   Substance Use Topics     Alcohol use: Yes      Problem (# of Occurrences) Relation (Name,Age of Onset)    Hyperlipidemia (1) Other    Hypertension (2) Father, Paternal Grandmother            Current Outpatient Medications   Medication Sig     Cholecalciferol (VITAMIN D3 PO) Taking seasonally.     citalopram (CELEXA) 10 MG tablet Take 10 mg by mouth daily     fish oil-omega-3 fatty acids 1000 MG capsule Take 2 g by mouth daily     valACYclovir (VALTREX) 1000 mg tablet TAKE 2 TABLETS(2000 MG) BY MOUTH TWICE DAILY     No current facility-administered medications for this visit.     No Known Allergies    Past medical, surgical, social and family histories were reviewed and updated in EPIC.    ROS:   12 point review of systems negative other than symptoms noted below or in the HPI.  No urinary frequency or dysuria, bladder or kidney problems, Normal menstrual cycles    EXAM:  /56   Ht 1.6 m (5' 3\")   Wt 59 kg (130 lb)   LMP 2022   BMI 23.03 kg/m     BMI: Body mass index is 23.03 kg/m .    PHYSICAL EXAM:  Constitutional:   Appearance: Well nourished, well developed, alert, in no acute distress  Neck:  Lymph Nodes:  No lymphadenopathy present    Thyroid:  Gland size normal, nontender, no nodules or masses present  on palpation  Chest:  Respiratory Effort:  Breathing unlabored  Cardiovascular:    Heart: Auscultation:  Regular rate, " normal rhythm, no murmurs present  Breasts: Palpation of Breasts and Axillae:  No masses present on palpation, no breast tenderness., Axillary Lymph Nodes:  No lymphadenopathy present. and No nodularity, asymmetry or nipple discharge bilaterally.  Gastrointestinal:   Abdominal Examination:  Abdomen nontender to palpation, tone normal without rigidity or guarding, no masses present, umbilicus without lesions   Liver and Spleen:  No hepatomegaly present, liver nontender to palpation    Hernias:  No hernias present  Lymphatic: Lymph Nodes:  No other lymphadenopathy present  Skin:  General Inspection:  No rashes present, no lesions present, no areas of  discoloration  Neurologic:    Mental Status:  Oriented X3.  Normal strength and tone, sensory exam                grossly normal, mentation intact and speech normal.    Psychiatric:   Mentation appears normal and affect normal/bright.         Pelvic Exam:  External Genitalia:     Normal appearance for age, no discharge present, no tenderness present, no inflammatory lesions present, color normal  Vagina:     Normal vaginal vault without central or paravaginal defects, no discharge present, no inflammatory lesions present, no masses present  Bladder:     Nontender to palpation  Urethra:   Urethral Body:  Urethra palpation normal, urethra structural support normal   Urethral Meatus:  No erythema or lesions present  Cervix:     Appearance healthy, no lesions present, nontender to palpation, no bleeding present  Uterus:     Uterus: firm, normal sized and nontender, anteverted in position.   Adnexa:     No adnexal tenderness present, no adnexal masses present  Perineum:     Perineum within normal limits, no evidence of trauma, no rashes or skin lesions present  Anus:     Anus within normal limits, no hemorrhoids present  Inguinal Lymph Nodes:     No lymphadenopathy present  Pubic Hair:     Normal pubic hair distribution for age  Genitalia and Groin:     No rashes present, no  lesions present, no areas of discoloration, no masses present      COUNSELING:   Reviewed preventive health counseling, as reflected in patient instructions  Special attention given to:        Regular exercise       Healthy diet/nutrition       Colorectal Cancer Screening       (Latesha)menopause management    BMI: Body mass index is 23.03 kg/m .      ASSESSMENT:  47 year old female with satisfactory annual exam.    ICD-10-CM    1. Encounter for gynecological examination without abnormal finding  Z01.419        PLAN:  Patient Instructions   Follow up with your primary care provider for your other medical problems.  Continue self breast exam.  Increase physical activity and exercise.  Usual safety and preventative measures counseling done.  Flu Shot today.  Last pap smear (2021) was normal and negative for the DNA of high risk HPV subtypes.  No pap was obtained this year.  This was discussed with the patient and she agrees with the plan.  Card given for Nuvia Schaffer for sex therapy given complicated past with sexual health.   Will arrange first colonoscopy; referral placed by PCP per patient report.      Salina Garcia MD

## 2022-10-06 ENCOUNTER — HOSPITAL ENCOUNTER (OUTPATIENT)
Dept: MAMMOGRAPHY | Facility: CLINIC | Age: 47
Discharge: HOME | End: 2022-10-06
Attending: OBSTETRICS & GYNECOLOGY | Admitting: OBSTETRICS & GYNECOLOGY
Payer: COMMERCIAL

## 2022-10-06 DIAGNOSIS — Z12.31 VISIT FOR SCREENING MAMMOGRAM: ICD-10-CM

## 2022-10-06 PROCEDURE — 77067 SCR MAMMO BI INCL CAD: CPT

## 2023-05-09 NOTE — PROGRESS NOTES
SUBJECTIVE:                                                   Stefani Lund is a 48 year old female who presents to clinic today for the following health issue(s):  Patient presents with:  Consult: Perimenopause sx- spout of anxiety, night sweats, irregular periods and fatigue      Additional information: NA    HPI:  Here today to discuss possible perimenopausal symptoms.  Had very stressful time in Feb-March with illness in her son.  Also around that time was not having periods and was noticing more night sweats and anxiety.  Often would wake up very early with night sweats/chills and then have trouble falling asleep due to nausea and worry.   Discussed symptoms with her PCP Dr. Saldana at that time.  Increased her citalopram dosing, started seeing therapist, became more active in yoga, started more supplements, etc.  Symptoms did seem to linger in spite of her son's illness resolving.   Has since had another period (late April) and feeling much better.  Hard to know how much was hormonal vs other interventions that were implemented to help specifically with anxiety.  Eating/drinking well.  Staying active.  No daytime symptoms of hot flushes.  Moods good.    Several friends use different hormones and she wanted to discuss perimenopause and options if symptoms/issues were to return.  Fully understands and agrees that most recent episode was multifactorial and also in response to very stressful situation  Less than one month of school left!  Son graduating from high school this year.  Will travel to [place] this summer!  Very excited      Patient's last menstrual period was 2023..     Patient is sexually active, .  Using none for contraception.    reports that she has never smoked. She has never used smokeless tobacco.  STD testing offered?  Declined    Health maintenance updated:  Yes, reviewed    Today's PHQ-2 Score:       2023     9:14 AM   PHQ-2 (  [employer])   Q1: Little interest or  "pleasure in doing things 0   Q2: Feeling down, depressed or hopeless 0   PHQ-2 Score 0     Today's PHQ-9 Score:       9/29/2022     3:07 PM   PHQ-9 SCORE   PHQ-9 Total Score 0     Today's JUAN FRANCISCO-7 Score:       9/29/2022     3:07 PM   JUAN FRANCISCO-7 SCORE   Total Score 0       Problem list and histories reviewed & adjusted, as indicated.  Additional history: as documented.    There is no problem list on file for this patient.    Past Surgical History:   Procedure Laterality Date     LAPAROSCOPY  1999/2000    Endometriosis      Social History     Tobacco Use     Smoking status: Never     Smokeless tobacco: Never   Vaping Use     Vaping status: Not on file   Substance Use Topics     Alcohol use: Yes      Problem (# of Occurrences) Relation (Name,Age of Onset)    Hypertension (2) Father, Paternal Grandmother    Hyperlipidemia (1) Other            Current Outpatient Medications   Medication Sig     Cholecalciferol (VITAMIN D3 PO) Taking seasonally.     citalopram (CELEXA) 10 MG tablet Take 10 mg by mouth daily     citalopram (CELEXA) 20 MG tablet      fish oil-omega-3 fatty acids 1000 MG capsule Take 2 g by mouth daily     valACYclovir (VALTREX) 1000 mg tablet TAKE 2 TABLETS(2000 MG) BY MOUTH TWICE DAILY     No current facility-administered medications for this visit.     No Known Allergies    ROS:  12 point review of systems negative other than symptoms noted below or in the HPI.  No urinary frequency or dysuria, bladder or kidney problems      OBJECTIVE:     /64   Ht 1.6 m (5' 3\")   Wt 58.6 kg (129 lb 3.2 oz)   LMP 04/20/2023   BMI 22.89 kg/m    Body mass index is 22.89 kg/m .    Exam:  Constitutional:  Appearance: Well nourished, well developed alert, in no acute distress  Neurologic:  Mental Status:  Oriented X3.  Normal strength and tone, sensory exam grossly normal, mentation intact and speech normal.    Psychiatric:  Mentation appears normal and affect normal/bright.     In-Clinic Test Results:  No results found for " this or any previous visit (from the past 24 hour(s)).    ASSESSMENT/PLAN:                                                        ICD-10-CM    1. Night sweats  R61       2. Anxiety  F41.9           Patient Instructions   Long discussion today regarding perimenopause and common symptoms with hormonal changes of this transition.  Discussed slow waxing and waning of ovarian function and estrogen levels which can lead to irregular cycles and symptoms during lower estrogen level times.    With recent stressful situation at home, I suspect that her recent symptoms were a combination of both hormonal effects as well as a major stressor.  With feeling better at this time, we will continue to monitor symptoms moving forward.  If perimenopausal symptoms were to worsen in spite of life situation, may consider HRT at that time.  Discussed HRT and risks and benefits.  Discussed importance of responsible use with the lowest effective dose for the shortest time needed.  Discussed trials and safety profile in the appropriate women.    Will otherwise follow up with me for yearly exam in September.      Total time spent on day of visit with chart/results review, patient visit and counseling and documentation:  31min    Salina Garcia MD  [facility]

## 2023-05-11 ENCOUNTER — OFFICE VISIT (OUTPATIENT)
Dept: OBGYN | Facility: CLINIC | Age: 48
End: 2023-05-11
Payer: COMMERCIAL

## 2023-05-11 VITALS
SYSTOLIC BLOOD PRESSURE: 102 MMHG | HEIGHT: 63 IN | BODY MASS INDEX: 22.89 KG/M2 | WEIGHT: 129.2 LBS | DIASTOLIC BLOOD PRESSURE: 64 MMHG

## 2023-05-11 DIAGNOSIS — F41.9 ANXIETY: ICD-10-CM

## 2023-05-11 DIAGNOSIS — R61 NIGHT SWEATS: Primary | ICD-10-CM

## 2023-05-11 PROCEDURE — 99214 OFFICE O/P EST MOD 30 MIN: CPT | Performed by: OBSTETRICS & GYNECOLOGY

## 2023-05-11 RX ORDER — CITALOPRAM HYDROBROMIDE 20 MG/1
TABLET ORAL
COMMUNITY
Start: 2023-03-26

## 2023-05-11 NOTE — PATIENT INSTRUCTIONS
Long discussion today regarding perimenopause and common symptoms with hormonal changes of this transition.  Discussed slow waxing and waning of ovarian function and estrogen levels which can lead to irregular cycles and symptoms during lower estrogen level times.    With recent stressful situation at home, I suspect that her recent symptoms were a combination of both hormonal effects as well as a major stressor.  With feeling better at this time, we will continue to monitor symptoms moving forward.  If perimenopausal symptoms were to worsen in spite of life situation, may consider HRT at that time.  Discussed HRT and risks and benefits.  Discussed importance of responsible use with the lowest effective dose for the shortest time needed.  Discussed trials and safety profile in the appropriate women.    Will otherwise follow up with me for yearly exam in September.

## 2023-10-17 NOTE — PROGRESS NOTES
Stefani is a 48 year old  female who presents for annual exam.     Besides routine health maintenance, she has no other health concerns today .    HPI:  Here today for yearly exam --doing well.   Perimenopausal.  Continues to have periods about every other month.  Usually 4d periods.  Occ night sweat but tolerable.  No intermenstrual bleeding or spotting.  +SA--occ dryness but uses lubricant with pretty good results.  No bowel/bladder issues.    Some leaking but nothing acutely different or bothersome.    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 Saldana; follows meds, bloodwork, etc.  Had labs this summer  -still has not yet scheduled colonoscopy  -had flu shot at school      GYNECOLOGIC HISTORY:    Patient's last menstrual period was 2023 (exact date).    Regular menses? no  Menses every couple months.  Length of menses: 4 days    Her current contraception method is: none.  She  reports that she has never smoked. She has never used smokeless tobacco.    Patient is sexually active.  STD testing offered?  Declined  Last PHQ-9 score on record =       10/19/2023     9:03 AM   PHQ-9 SCORE   PHQ-9 Total Score 0     Last GAD7 score on record =       10/19/2023     9:03 AM   JUAN FRANCISCO-7 SCORE   Total Score 0     Alcohol Score = 3    HEALTH MAINTENANCE:  Cholesterol:   Cholesterol   Date Value Ref Range Status   2018 196 <200 mg/dL Final   2013 199 115 - 199 mg/dL Final   Last Mammo: One year ago, Result: Normal, Next Mammo: needs to schedule   Pap:   Lab Results   Component Value Date    PAP NIL 2021    PAP NIL 2018 WNL HPV (-)neg  Colonoscopy:  Never, Result: Not applicable, Next Colonoscopy: PCP placed order  Dexa:  " NA    Health maintenance updated:  yes    HISTORY:  OB History    Para Term  AB Living   0 0 0 0 0 2   SAB IAB Ectopic Multiple Live Births   0 0 0 0 0   Obstetric Comments   Adopted       Patient Active Problem List   Diagnosis    Recurrent HSV (herpes simplex virus)    Abdominal pain    Anxiety    Endometriosis    Prediabetes     Past Surgical History:   Procedure Laterality Date    LAPAROSCOPY      Endometriosis      Social History     Tobacco Use    Smoking status: Never    Smokeless tobacco: Never   Substance Use Topics    Alcohol use: Yes      Problem (# of Occurrences) Relation (Name,Age of Onset)    Hypertension (2) Father, Paternal Grandmother    Hyperlipidemia (1) Other    No Known Problems (5) Mother, Brother, Maternal Grandmother, Maternal Grandfather, Paternal Grandfather              Current Outpatient Medications   Medication Sig    Cholecalciferol (VITAMIN D3 PO) Taking seasonally.    citalopram (CELEXA) 10 MG tablet Take 10 mg by mouth daily    citalopram (CELEXA) 20 MG tablet     Continuous Blood Gluc Sensor ([employer] VINNY 3 SENSOR) [facility] USE 1 SENSOR AND CHANGE EVERY 14 DAYS TO READ BLOOD SUGAR LEVELS    fish oil-omega-3 fatty acids 1000 MG capsule Take 2 g by mouth daily    valACYclovir (VALTREX) 1000 mg tablet Take 2 tablets (2,000 mg) by mouth 2 times daily     No current facility-administered medications for this visit.     No Known Allergies    Past medical, surgical, social and family histories were reviewed and updated in EPIC.    ROS:   12 point review of systems negative other than symptoms noted below or in the HPI.  Genitourinary: Irregular Menses  No urinary frequency or dysuria, bladder or kidney problems    EXAM:  /66   Ht 1.6 m (5' 3\")   Wt 60.8 kg (134 lb)   LMP 2023 (Exact Date)   BMI 23.74 kg/m     BMI: Body mass index is 23.74 kg/m .    PHYSICAL EXAM:  Constitutional:   Appearance: Well nourished, well developed, alert, in no acute " distress  Neck:  Lymph Nodes:  No lymphadenopathy present    Thyroid:  Gland size normal, nontender, no nodules or masses present  on palpation  Chest:  Respiratory Effort:  Breathing unlabored  Cardiovascular:    Heart: Auscultation:  Regular rate, normal rhythm, no murmurs present  Breasts: Palpation of Breasts and Axillae:  No masses present on palpation, no breast tenderness., Axillary Lymph Nodes:  No lymphadenopathy present., and No nodularity, asymmetry or nipple discharge bilaterally.  Gastrointestinal:   Abdominal Examination:  Abdomen nontender to palpation, tone normal without rigidity or guarding, no masses present, umbilicus without lesions   Liver and Spleen:  No hepatomegaly present, liver nontender to palpation    Hernias:  No hernias present  Lymphatic: Lymph Nodes:  No other lymphadenopathy present  Skin:  General Inspection:  No rashes present, no lesions present, no areas of  discoloration  Neurologic:    Mental Status:  Oriented X3.  Normal strength and tone, sensory exam                grossly normal, mentation intact and speech normal.    Psychiatric:   Mentation appears normal and affect normal/bright.         Pelvic Exam:  External Genitalia:     Normal appearance for age, no discharge present, no tenderness present, no inflammatory lesions present, color normal  Vagina:     Normal vaginal vault without central or paravaginal defects, no discharge present, no inflammatory lesions present, no masses present  Bladder:     Nontender to palpation  Urethra:   Urethral Body:  Urethra palpation normal, urethra structural support normal   Urethral Meatus:  No erythema or lesions present  Cervix:     Appearance healthy, no lesions present, nontender to palpation, no bleeding present  Uterus:     Uterus: firm, normal sized and nontender, midplane in position.   Adnexa:     No adnexal tenderness present, no adnexal masses present  Perineum:     Perineum within normal limits, no evidence of trauma, no  rashes or skin lesions present  Anus:     Anus within normal limits, no hemorrhoids present  Inguinal Lymph Nodes:     No lymphadenopathy present  Pubic Hair:     Normal pubic hair distribution for age  Genitalia and Groin:     No rashes present, no lesions present, no areas of discoloration, no masses present    COUNSELING:   Reviewed preventive health counseling, as reflected in patient instructions  Special attention given to:        Regular exercise       Healthy diet/nutrition       Colorectal Cancer Screening       (Latesha)menopause management    BMI: Body mass index is 23.74 kg/m .      ASSESSMENT:  48 year old female with satisfactory annual exam.    ICD-10-CM    1. Encounter for gynecological examination without abnormal finding  Z01.419       2. Cold sore  B00.1 valACYclovir (VALTREX) 1000 mg tablet          PLAN:  Patient Instructions   Schedule your annual screening mammogram  Follow up with your primary care provider for your other medical problems.  Continue self breast exam.  Increase physical activity and exercise.  Usual safety and preventative measures counseling done.  Last pap smear (2021) was normal and negative for the DNA of high risk HPV subtypes.  No pap was obtained this year.  This was discussed with the patient and she agrees with the plan.  Again discussed the importance of colon cancer screening; referral placed by her PCP and she agrees to try to arrange over her holiday break this year.       Salina Garcia MD

## 2023-10-19 ENCOUNTER — OFFICE VISIT (OUTPATIENT)
Dept: OBGYN | Facility: CLINIC | Age: 48
End: 2023-10-19
Payer: COMMERCIAL

## 2023-10-19 VITALS
SYSTOLIC BLOOD PRESSURE: 112 MMHG | BODY MASS INDEX: 23.74 KG/M2 | HEIGHT: 63 IN | DIASTOLIC BLOOD PRESSURE: 66 MMHG | WEIGHT: 134 LBS

## 2023-10-19 DIAGNOSIS — B00.1 COLD SORE: ICD-10-CM

## 2023-10-19 DIAGNOSIS — Z01.419 ENCOUNTER FOR GYNECOLOGICAL EXAMINATION WITHOUT ABNORMAL FINDING: Primary | ICD-10-CM

## 2023-10-19 PROBLEM — R73.03 PREDIABETES: Status: ACTIVE | Noted: 2020-01-05

## 2023-10-19 PROBLEM — F41.9 ANXIETY: Status: ACTIVE | Noted: 2019-02-19

## 2023-10-19 PROCEDURE — 99396 PREV VISIT EST AGE 40-64: CPT | Performed by: OBSTETRICS & GYNECOLOGY

## 2023-10-19 RX ORDER — BLOOD-GLUCOSE SENSOR
EACH MISCELLANEOUS
COMMUNITY
Start: 2023-08-30

## 2023-10-19 RX ORDER — VALACYCLOVIR HYDROCHLORIDE 1 G/1
2000 TABLET, FILM COATED ORAL 2 TIMES DAILY
Qty: 90 TABLET | Refills: 1 | Status: SHIPPED | OUTPATIENT
Start: 2023-10-19

## 2023-10-19 ASSESSMENT — ANXIETY QUESTIONNAIRES
1. FEELING NERVOUS, ANXIOUS, OR ON EDGE: NOT AT ALL
7. FEELING AFRAID AS IF SOMETHING AWFUL MIGHT HAPPEN: NOT AT ALL
GAD7 TOTAL SCORE: 0
3. WORRYING TOO MUCH ABOUT DIFFERENT THINGS: NOT AT ALL
5. BEING SO RESTLESS THAT IT IS HARD TO SIT STILL: NOT AT ALL
2. NOT BEING ABLE TO STOP OR CONTROL WORRYING: NOT AT ALL
6. BECOMING EASILY ANNOYED OR IRRITABLE: NOT AT ALL
GAD7 TOTAL SCORE: 0
IF YOU CHECKED OFF ANY PROBLEMS ON THIS QUESTIONNAIRE, HOW DIFFICULT HAVE THESE PROBLEMS MADE IT FOR YOU TO DO YOUR WORK, TAKE CARE OF THINGS AT HOME, OR GET ALONG WITH OTHER PEOPLE: NOT DIFFICULT AT ALL

## 2023-10-19 ASSESSMENT — PATIENT HEALTH QUESTIONNAIRE - PHQ9
SUM OF ALL RESPONSES TO PHQ QUESTIONS 1-9: 0
5. POOR APPETITE OR OVEREATING: NOT AT ALL

## 2023-10-19 NOTE — PATIENT INSTRUCTIONS
Schedule your annual screening mammogram  Follow up with your primary care provider for your other medical problems.  Continue self breast exam.  Increase physical activity and exercise.  Usual safety and preventative measures counseling done.  Last pap smear (2021) was normal and negative for the DNA of high risk HPV subtypes.  No pap was obtained this year.  This was discussed with the patient and she agrees with the plan.  Again discussed the importance of colon cancer screening; referral placed by her PCP and she agrees to try to arrange over her holiday break this year.

## 2023-11-13 ENCOUNTER — HOSPITAL ENCOUNTER (OUTPATIENT)
Dept: MAMMOGRAPHY | Facility: CLINIC | Age: 48
Discharge: HOME | End: 2023-11-13
Attending: INTERNAL MEDICINE | Admitting: INTERNAL MEDICINE
Payer: COMMERCIAL

## 2023-11-13 DIAGNOSIS — Z12.31 VISIT FOR SCREENING MAMMOGRAM: ICD-10-CM

## 2023-11-13 PROCEDURE — 77067 SCR MAMMO BI INCL CAD: CPT

## 2023-12-26 ENCOUNTER — TELEPHONE (OUTPATIENT)
Dept: OBGYN | Facility: CLINIC | Age: 48
End: 2023-12-26
Payer: COMMERCIAL

## 2023-12-26 NOTE — TELEPHONE ENCOUNTER
Panel Management Review    Date of last visit with a [facility] provider: Dr. Garcia on 10/19/2023.  Date of next visit with a [facility] provider: None.    Health Maintenance List    Health Maintenance   Topic Date Due    ADVANCE CARE PLANNING  Never done    HEPATITIS B IMMUNIZATION (1 of 3 - 3-dose series) Never done    COLORECTAL CANCER SCREENING  Never done    HIV SCREENING  Never done    HEPATITIS C SCREENING  Never done    LIPID  10/01/2024 (Originally 1/19/2023)    ZOSTER IMMUNIZATION (1 of 2) 04/03/2024    YEARLY PREVENTIVE VISIT  10/19/2024    MAMMO SCREENING  11/13/2024    HPV TEST  01/12/2026    PAP  01/12/2026    DTAP/TDAP/TD IMMUNIZATION (2 - Td or Tdap) 01/12/2031    PHQ-2 (once per calendar year)  Completed    INFLUENZA VACCINE  Completed    COVID-19 Vaccine  Completed    Pneumococcal Vaccine: Pediatrics (0 to 5 Years) and At-Risk Patients (6 to 64 Years)  Aged Out    IPV IMMUNIZATION  Aged Out    HPV IMMUNIZATION  Aged Out    MENINGITIS IMMUNIZATION  Aged Out    RSV MONOCLONAL ANTIBODY  Aged Out       Composite cancer screening  Chart review shows that this patient is due/due soon for the following Colonoscopy  Lab Results   Component Value Date    PAP NIL 01/12/2021     Past Surgical History:   Procedure Laterality Date    LAPAROSCOPY  1999/2000    Endometriosis       Is hysterectomy listed in surgical history? No   Is mastectomy listed in surgical history? No     Summary:    Patient is due/failing the following:   Colonoscopy    Action needed: Patient needs to schedule colonoscopy    Type of outreach:  Sent [employer] message.      Staff Signature:  Electronically signed by Denise Stoddard MA  on December 26, 2023 11:09 AM

## 2024-11-27 ENCOUNTER — OFFICE VISIT (OUTPATIENT)
Dept: OBGYN | Facility: CLINIC | Age: 49
End: 2024-11-27
Payer: COMMERCIAL

## 2024-11-27 ENCOUNTER — HOSPITAL ENCOUNTER (OUTPATIENT)
Dept: MAMMOGRAPHY | Facility: CLINIC | Age: 49
Discharge: HOME | End: 2024-11-27
Attending: INTERNAL MEDICINE
Payer: COMMERCIAL

## 2024-11-27 VITALS
SYSTOLIC BLOOD PRESSURE: 116 MMHG | WEIGHT: 139 LBS | DIASTOLIC BLOOD PRESSURE: 62 MMHG | BODY MASS INDEX: 24.63 KG/M2 | HEIGHT: 63 IN

## 2024-11-27 DIAGNOSIS — Z01.419 ENCOUNTER FOR GYNECOLOGICAL EXAMINATION WITHOUT ABNORMAL FINDING: Primary | ICD-10-CM

## 2024-11-27 DIAGNOSIS — R10.2 PELVIC PAIN IN FEMALE: ICD-10-CM

## 2024-11-27 DIAGNOSIS — N39.3 STRESS INCONTINENCE OF URINE: ICD-10-CM

## 2024-11-27 DIAGNOSIS — Z12.31 VISIT FOR SCREENING MAMMOGRAM: ICD-10-CM

## 2024-11-27 PROCEDURE — 99396 PREV VISIT EST AGE 40-64: CPT | Performed by: OBSTETRICS & GYNECOLOGY

## 2024-11-27 PROCEDURE — 77067 SCR MAMMO BI INCL CAD: CPT

## 2024-11-27 PROCEDURE — 77063 BREAST TOMOSYNTHESIS BI: CPT

## 2024-11-27 ASSESSMENT — ANXIETY QUESTIONNAIRES
6. BECOMING EASILY ANNOYED OR IRRITABLE: NOT AT ALL
1. FEELING NERVOUS, ANXIOUS, OR ON EDGE: NOT AT ALL
IF YOU CHECKED OFF ANY PROBLEMS ON THIS QUESTIONNAIRE, HOW DIFFICULT HAVE THESE PROBLEMS MADE IT FOR YOU TO DO YOUR WORK, TAKE CARE OF THINGS AT HOME, OR GET ALONG WITH OTHER PEOPLE: NOT DIFFICULT AT ALL
5. BEING SO RESTLESS THAT IT IS HARD TO SIT STILL: NOT AT ALL
GAD7 TOTAL SCORE: 0
GAD7 TOTAL SCORE: 0
3. WORRYING TOO MUCH ABOUT DIFFERENT THINGS: NOT AT ALL
2. NOT BEING ABLE TO STOP OR CONTROL WORRYING: NOT AT ALL
7. FEELING AFRAID AS IF SOMETHING AWFUL MIGHT HAPPEN: NOT AT ALL

## 2024-11-27 ASSESSMENT — PATIENT HEALTH QUESTIONNAIRE - PHQ9
SUM OF ALL RESPONSES TO PHQ QUESTIONS 1-9: 1
5. POOR APPETITE OR OVEREATING: NOT AT ALL

## 2024-11-27 NOTE — PATIENT INSTRUCTIONS
Follow up with your primary care provider for your other medical problems.  Discussed different symptoms of perimenopause and possible approaches/solutions.  Will try more stretching and supplementing with glucosamine to help with joint pains.   I would not recommend HRT for this indication alone.  Will refer to physical therapy for pelvic floor work to help with incontinence issues.  Continue self breast exam.  Increase physical activity and exercise.  Usual safety and preventative measures counseling done.  BMI >25  Last pap smear (2021) was normal and negative for the DNA of high risk HPV subtypes.  No pap was obtained this year.  This was discussed with the patient and she agrees with the plan.

## 2024-11-27 NOTE — PROGRESS NOTES
"Stefani is a 49 year old  female who presents for annual exam.     Besides routine health maintenance,  she would like to discuss Night sweats, muscle and joint pain, dull achy feeling in ovaries for the last few weeks, incontinence.    HPI:  Here today for yearly exam --doing well.  Perimenopausal.  Has been having a bit more irregular periods.  Usually having period every 2 months --longest stretch without has been 2.5 months.  Periods manageable x 3d.  Light bleeding.  Rare breakthrough bleeding/spotting.  No hot flushes.  Occ 4AM night sweat but tolerable.  Also noticing more joint aches and pains and wondering if HRT would help.  +SA --no issues.  No bowel issues.  Has had more \"ovarian pain\" over the last 2 weeks.  Bilateral and comes and goes.  +bloating/cramping at times.   +NICOLE --leaks with sneeze, coughing, laughing, etc.  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 Saldana; follows labs, meds, etc  -did cologard with PCP   -already did flu and covid vaccines this year      GYNECOLOGIC HISTORY:    Patient's last menstrual period was 2024 (exact date).    Regular menses? no    Her current contraception method is: none.  She  reports that she has never smoked. She has never used smokeless tobacco.    Patient is sexually active.  STD testing offered?  Declined  Last PHQ-9 score on record =       10/19/2023     9:03 AM   PHQ-9 SCORE   PHQ-9 Total Score 0     Last GAD7 score on record =       10/19/2023     9:03 AM   JUAN FRANCISCO-7 SCORE   Total Score 0     Alcohol Score = 2    HEALTH MAINTENANCE:  Cholesterol:   Cholesterol   Date Value Ref Range Status   2018 196 <200 mg/dL Final   2013 199 115 - 199 " "mg/dL Final   Last Mammo: One year ago, Result: Normal, Next Mammo: Today   Pap:   Lab Results   Component Value Date    PAP NIL 2021    PAP NIL 2018     Colonoscopy:  DId cologard through PCP, Result: Normal, Next Colonoscopy: q 3 years.  Dexa:  NA    Health maintenance updated:  yes    HISTORY:  OB History    Para Term  AB Living   0 0 0 0 0 0   SAB IAB Ectopic Multiple Live Births   0 0 0 0 0   Obstetric Comments   Adopted       Patient Active Problem List   Diagnosis    Recurrent HSV (herpes simplex virus)    Abdominal pain    Anxiety    Endometriosis    Prediabetes     Past Surgical History:   Procedure Laterality Date    LAPAROSCOPY      Endometriosis      Social History     Tobacco Use    Smoking status: Never    Smokeless tobacco: Never   Substance Use Topics    Alcohol use: Yes      Problem (# of Occurrences) Relation (Name,Age of Onset)    Hypertension (2) Father, Paternal Grandmother    Hyperlipidemia (1) Other    No Known Problems (5) Mother, Brother, Maternal Grandmother, Maternal Grandfather, Paternal Grandfather              Current Outpatient Medications   Medication Sig Dispense Refill    Cholecalciferol (VITAMIN D3 PO) Taking seasonally.      citalopram (CELEXA) 20 MG tablet       fish oil-omega-3 fatty acids 1000 MG capsule Take 2 g by mouth daily      valACYclovir (VALTREX) 1000 mg tablet Take 2 tablets (2,000 mg) by mouth 2 times daily 90 tablet 1     No current facility-administered medications for this visit.     No Known Allergies    Past medical, surgical, social and family histories were reviewed and updated in EPIC.    ROS:   12 point review of systems negative other than symptoms noted below or in the HPI.  Genitourinary: Incontinence, Night Sweats, and Pelvic Pain  Musculoskeletal: Joint Pain  No urinary frequency or dysuria, bladder or kidney problems    EXAM:  /62   Ht 1.588 m (5' 2.5\")   Wt 63 kg (139 lb)   LMP 2024 (Exact Date)   BMI " 25.02 kg/m     BMI: Body mass index is 25.02 kg/m .    PHYSICAL EXAM:  Constitutional:   Appearance: Well nourished, well developed, alert, in no acute distress  Neck:  Lymph Nodes:  No lymphadenopathy present    Thyroid:  Gland size normal, nontender, no nodules or masses present  on palpation  Chest:  Respiratory Effort:  Breathing unlabored  Cardiovascular:    Heart: Auscultation:  Regular rate, normal rhythm, no murmurs present  Breasts: Palpation of Breasts and Axillae:  No masses present on palpation, no breast tenderness., Axillary Lymph Nodes:  No lymphadenopathy present., and No nodularity, asymmetry or nipple discharge bilaterally.  Gastrointestinal:   Abdominal Examination:  Abdomen nontender to palpation, tone normal without rigidity or guarding, no masses present, umbilicus without lesions   Liver and Spleen:  No hepatomegaly present, liver nontender to palpation    Hernias:  No hernias present  Lymphatic: Lymph Nodes:  No other lymphadenopathy present  Skin:  General Inspection:  No rashes present, no lesions present, no areas of  discoloration  Neurologic:    Mental Status:  Oriented X3.  Normal strength and tone, sensory exam                grossly normal, mentation intact and speech normal.    Psychiatric:   Mentation appears normal and affect normal/bright.         Pelvic Exam:  External Genitalia:     Normal appearance for age, no discharge present, no tenderness present, no inflammatory lesions present, color normal  Vagina:     Normal vaginal vault without central or paravaginal defects, no discharge present, no inflammatory lesions present, no masses present  Bladder:     Nontender to palpation  Urethra:   Urethral Body:  Urethra palpation normal, urethra structural support normal   Urethral Meatus:  No erythema or lesions present  Cervix:     Appearance healthy, no lesions present, nontender to palpation, no bleeding present  Uterus:     Uterus: firm, normal sized and nontender, midplane in  position.   Adnexa:     No adnexal tenderness present, no adnexal masses present  Perineum:     Perineum within normal limits, no evidence of trauma, no rashes or skin lesions present  Anus:     Anus within normal limits, no hemorrhoids present  Inguinal Lymph Nodes:     No lymphadenopathy present  Pubic Hair:     Normal pubic hair distribution for age  Genitalia and Groin:     No rashes present, no lesions present, no areas of discoloration, no masses present    COUNSELING:   Reviewed preventive health counseling, as reflected in patient instructions  Special attention given to:        Regular exercise       Healthy diet/nutrition       Colorectal Cancer Screening       (Latesha)menopause management    BMI: Body mass index is 25.02 kg/m .  Weight management plan: Discussed healthy diet and exercise guidelines Patient was referred to their PCP to discuss a diet and exercise plan.    ASSESSMENT:  49 year old female with satisfactory annual exam.    ICD-10-CM    1. Encounter for gynecological examination without abnormal finding  Z01.419       2. Stress incontinence of urine  N39.3 Physical Therapy  Referral      3. Pelvic pain in female  R10.2           PLAN:  Patient Instructions   Follow up with your primary care provider for your other medical problems.  Discussed different symptoms of perimenopause and possible approaches/solutions.  Will try more stretching and supplementing with glucosamine to help with joint pains.   I would not recommend HRT for this indication alone.  Will refer to physical therapy for pelvic floor work to help with incontinence issues.  Continue self breast exam.  Increase physical activity and exercise.  Usual safety and preventative measures counseling done.  BMI >25  Last pap smear (2021) was normal and negative for the DNA of high risk HPV subtypes.  No pap was obtained this year.  This was discussed with the patient and she agrees with the plan.       Salina Garcia MD

## 2025-01-23 ENCOUNTER — THERAPY VISIT (OUTPATIENT)
Dept: PHYSICAL THERAPY | Facility: CLINIC | Age: 50
End: 2025-01-23
Attending: OBSTETRICS & GYNECOLOGY
Payer: COMMERCIAL

## 2025-01-23 DIAGNOSIS — N39.3 STRESS INCONTINENCE OF URINE: ICD-10-CM

## 2025-01-23 NOTE — PROGRESS NOTES
PHYSICAL THERAPY EVALUATION  Type of Visit: Evaluation       Fall Risk Screen:  Fall screen completed by: PT  Have you fallen 2 or more times in the past year?: No  Have you fallen and had an injury in the past year?: No  Is patient a fall risk?: No    Subjective         Presenting condition or subjective complaint: leakage when running jumping sneezing laughing    Patient presents to physical therapy for evaluation. Patient reports symptoms have been going on for approximately 3 years. She reports leakage with running for pickleball. She reports that sneezing, laughing, jumping jacks also cause leakage. Patient reports insidious onset of symptoms, no coinciding injury. Patient reports leakage enough to make underwear damp. Patient reports frequency of leakage 1x per week. She reports having to cross legs when sneezing.     She reports she would like to continue to participate in yoga and pickleball and continue to be active without leakage.  Patient reports 2 adopted children, 20 and 18.    Date of onset: 11/27/24 (order date)    Relevant medical history:     Past Medical History:   Diagnosis Date    Cold sore     Endometriosis of pelvic peritoneum     Female infertility, unspecified     Endometriosis.      Dates & types of surgery: endometriosis laparoscopy in late 1998 and 2000  Past Surgical History:   Procedure Laterality Date    LAPAROSCOPY  1999/2000    Endometriosis      Prior diagnostic imaging/testing results:       Prior therapy history for the same diagnosis, illness or injury: No      Prior Level of Function  Transfers: Independent  Ambulation: Independent  ADL: Independent  IADL:     Living Environment  Social support: With a significant other or spouse   Type of home: House   Stairs to enter the home: Yes       Ramp: No   Stairs inside the home: Yes   Is there a railing: Yes     Help at home: None  Equipment owned:       Employment: Yes   Hobbies/Interests: yoga cooking  pickleball    Patient goals for therapy: be active and no leakage    Pain assessment: Pain denied     Objective      PELVIC EVALUATION  ADDITIONAL HISTORY:  Sex assigned at birth: Female  Gender identity: Female    Pronouns: She/Her Hers      Bladder History:  Feels bladder filling: Yes  Triggers for feeling of inability to wait to go to the bathroom: No    How long can you wait to urinate: 30 minutes; every few hours  Gets up at night to urinate: No    Can stop the flow of urine when urinating: Yes   Volume of urine usually released: Medium   Other issues:  reports incomplete emptying, no pushing/straining   Number of bladder infections in last 12 months:   no UTI/bladder infections reported by patient  Fluid intake per day: 800 ml 500 ml   coffee with milk, 2 cups tea, a few glasses of water  Medications taken for bladder: No      Activities causing urine leak: Cough; Sneeze; Jump; Run    Amount of urine typically leaked: a little  Pads used to help with leaking: No         Bowel History:  Frequency of bowel movement: 2  Consistency of stool: Hard   type 1 or type 4  Ignores the urge to defecate: No  Other bowel issues: Loss of gas; some pushing or straining to pass stool  Length of time spent trying to have a bowel movement:      Sexual Function History:  Sexual orientation: Straight    Sexually active: Yes  Lubrication used: No No  Pelvic pain:     no pain with speculum, no pain with tampon use; hx of pain with intercourse d/t endometriosis, no current pain with intercourse for the last few years  Pain or difficulty with orgasms/erection/ejaculation: No    State of menopause: Perimenopause (have not gone through menopause yet)  Hormone medications: No      Are you currently pregnant: No  Number of previous pregnancies: 0  Number of deliveries: 0  Have you been diagnosed with pelvic prolapse or abdominal separation: No  Do you get regular exercise: Yes  I do this type of exercise: yoga  Have you tried pelvic  floor strengthening exercises for 4 weeks: No  Do you have any history of trauma that is relevant to your care that you d like to share: No    Discussed reason for referral regarding pelvic health needs and external/internal pelvic floor muscle examination with patient/guardian.  Opportunity provided to ask questions and verbal consent for assessment and intervention was given.    PAIN: Pain Level at Rest: 0/10  Pain Level with Use: 0/10  Pain Location: no pain reported  Pain Quality: no pain reported  Pain Frequency: 1x per week  Pain is Worst: associated with activity  Pain is Exacerbated By: running, jumping, coughing/sneezing  Pain is Relieved By: crossing legs, tightening muscles  Pain Progression: Worsened    POSTURE: Standing Posture: Genu recurvatum, WNL  Sitting Posture: Rounded shoulders, Forward head, Thoracic kyphosis increased  LUMBAR SCREEN: AROM WNL  HIP SCREEN:  Strength: WNL   Functional Strength Testing:     PELVIC/SI SCREEN:     PAIN PROVOCATION TEST:   PELVIS/SI SPECIAL TESTS:   BREATHING SYMMETRY:     PELVIC EXAM  External Visual Inspection:  At rest: Normal  With voluntary pelvic floor contraction: Perineal elevation, Present  Relaxation of PFM: Yes  With intra-abdominal pressure: Cough: Perineal descent  Bearing down as defecation: Perineal descent    Integumentary:   Introitus: Discolored, Atrophy    External Digital Palpation per Perineum:   Ischiocavernosis: Unremarkable  Bulbo cavernosis: Unremarkable  Transverse perineal: Unremarkable  Levator ani: Unremarkable  Perineal body: Unremarkable  Public symphysis: Unremarkable    Scar:   Location/Type:   Mobility:     Internal Digital Palpation:  Per Vagina:  Tenderness  Myofascial Resistance to Palpation: Taut  Digital Muscle Performance: P (Power): 4  E (Endurance): 10  R (Repetitions): 3  F (Fast Twitch): 10  Compensations: Adductors, Gluts  Relaxation Post-Contraction: Partial/delayed relaxation  Palpable stool in rectum    Per  Rectum:        Pelvic Organ Prolapse:       ABDOMINAL ASSESSMENT  Diastasis Rectus Abdominis (ESTEFANIA):      Abdominal Activation/Strength:     Scar:   Location/Type:   Mobility:     Fascial Tension/Restriction:     BIOFEEDBACK:  Position:   Surface Electrodes:     Abdominals:     Perianals:       DERMATOMES:   DTR S:     Assessment & Plan   CLINICAL IMPRESSIONS  Medical Diagnosis: stress incontinence of urine    Treatment Diagnosis: increased pelvic floor muscle tension, pelvic floor muscle incoordination   Impression/Assessment: Patient is a 49 year old female with stress urinary incontinence complaints.  The following significant findings have been identified: Decreased ROM/flexibility, Decreased strength, Decreased proprioception, Impaired sensation, Impaired muscle performance, and Decreased activity tolerance. These impairments interfere with their ability to perform self care tasks, work tasks, and recreational activities as compared to previous level of function.     Clinical Decision Making (Complexity):  Clinical Presentation: Stable/Uncomplicated  Clinical Presentation Rationale: based on medical and personal factors listed in PT evaluation  Clinical Decision Making (Complexity): Low complexity    PLAN OF CARE  Treatment Interventions:  Modalities: Biofeedback, Ultrasound  Interventions: Manual Therapy, Neuromuscular Re-education, Therapeutic Activity, Therapeutic Exercise, Self-Care/Home Management    Long Term Goals     PT Goal 1  Goal Identifier: LTG1  Goal Description: Patient will report 1 hour of participation in pickleball without urinary leakage 5 times.  Rationale: to maximize safety and independence with performance of ADLs and functional tasks;to maximize safety and independence within the community;to maximize safety and independence within the home;to maximize safety and independence with self cares  Target Date: 04/22/25  PT Goal 2  Goal Identifier: LTG2  Goal Description: Patient will  demonstrate increased pelvic floor and breathing  coordination with knack to prevent leakage with coughing and sneezing.  Rationale: to maximize safety and independence with performance of ADLs and functional tasks;to maximize safety and independence within the home;to maximize safety and independence within the community;to maximize safety and independence with self cares  Target Date: 04/22/25      Frequency of Treatment: 1x per week for 3 weeks, 1x every other week for 9 weeks  Duration of Treatment: 12 weeks    Recommended Referrals to Other Professionals:   Education Assessment:   Learner/Method: Patient    Risks and benefits of evaluation/treatment have been explained.   Patient/Family/caregiver agrees with Plan of Care.     Evaluation Time:     PT Eval, Low Complexity Minutes (84173): 20       Signing Clinician: Carloz Galan PT

## 2025-02-25 ENCOUNTER — THERAPY VISIT (OUTPATIENT)
Dept: PHYSICAL THERAPY | Facility: CLINIC | Age: 50
End: 2025-02-25
Payer: COMMERCIAL

## 2025-02-25 DIAGNOSIS — N39.3 STRESS INCONTINENCE OF URINE: Primary | ICD-10-CM

## 2025-02-25 PROCEDURE — 97530 THERAPEUTIC ACTIVITIES: CPT | Mod: GP

## 2025-02-25 PROCEDURE — 97110 THERAPEUTIC EXERCISES: CPT | Mod: GP
